# Patient Record
Sex: MALE | Race: WHITE
[De-identification: names, ages, dates, MRNs, and addresses within clinical notes are randomized per-mention and may not be internally consistent; named-entity substitution may affect disease eponyms.]

---

## 2019-01-26 ENCOUNTER — HOSPITAL ENCOUNTER (OUTPATIENT)
Dept: HOSPITAL 62 - ER | Age: 24
Setting detail: OBSERVATION
LOS: 1 days | Discharge: HOME | End: 2019-01-27
Attending: SURGERY | Admitting: SURGERY
Payer: OTHER GOVERNMENT

## 2019-01-26 DIAGNOSIS — F41.9: ICD-10-CM

## 2019-01-26 DIAGNOSIS — K35.33: Primary | ICD-10-CM

## 2019-01-26 DIAGNOSIS — F43.10: ICD-10-CM

## 2019-01-26 DIAGNOSIS — Z98.890: ICD-10-CM

## 2019-01-26 DIAGNOSIS — F17.290: ICD-10-CM

## 2019-01-26 LAB
ABSOLUTE LYMPHOCYTES# (MANUAL): 3.1 10^3/UL (ref 0.5–4.7)
ABSOLUTE MONOCYTES # (MANUAL): 1.4 10^3/UL (ref 0.1–1.4)
ABSOLUTE NEUTROPHILS# (MANUAL): 15.7 10^3/UL (ref 1.7–8.2)
ADD MANUAL DIFF: YES
ALBUMIN SERPL-MCNC: 5.2 G/DL (ref 3.5–5)
ALP SERPL-CCNC: 83 U/L (ref 38–126)
ALT SERPL-CCNC: 46 U/L (ref 21–72)
ANION GAP SERPL CALC-SCNC: 13 MMOL/L (ref 5–19)
AST SERPL-CCNC: 25 U/L (ref 17–59)
BASOPHILS NFR BLD MANUAL: 0 % (ref 0–2)
BILIRUB DIRECT SERPL-MCNC: 0.2 MG/DL (ref 0–0.4)
BILIRUB SERPL-MCNC: 0.4 MG/DL (ref 0.2–1.3)
BUN SERPL-MCNC: 19 MG/DL (ref 7–20)
CALCIUM: 10.4 MG/DL (ref 8.4–10.2)
CHLORIDE SERPL-SCNC: 99 MMOL/L (ref 98–107)
CO2 SERPL-SCNC: 29 MMOL/L (ref 22–30)
EOSINOPHIL NFR BLD MANUAL: 2 % (ref 0–6)
ERYTHROCYTE [DISTWIDTH] IN BLOOD BY AUTOMATED COUNT: 12.8 % (ref 11.5–14)
GLUCOSE SERPL-MCNC: 128 MG/DL (ref 75–110)
HCT VFR BLD CALC: 46.1 % (ref 37.9–51)
HGB BLD-MCNC: 16 G/DL (ref 13.5–17)
LIPASE SERPL-CCNC: 47.8 U/L (ref 23–300)
MCH RBC QN AUTO: 30.3 PG (ref 27–33.4)
MCHC RBC AUTO-ENTMCNC: 34.6 G/DL (ref 32–36)
MCV RBC AUTO: 88 FL (ref 80–97)
MONOCYTES % (MANUAL): 7 % (ref 3–13)
PLATELET # BLD: 346 10^3/UL (ref 150–450)
PLATELET COMMENT: ADEQUATE
PLATELET LARGE: PRESENT
POTASSIUM SERPL-SCNC: 4.5 MMOL/L (ref 3.6–5)
PROT SERPL-MCNC: 8.1 G/DL (ref 6.3–8.2)
RBC # BLD AUTO: 5.27 10^6/UL (ref 4.35–5.55)
SCHISTOCYTES BLD QL SMEAR: SLIGHT
SEGMENTED NEUTROPHILS % (MAN): 76 % (ref 42–78)
SODIUM SERPL-SCNC: 140.8 MMOL/L (ref 137–145)
TOTAL CELLS COUNTED BLD: 100
VARIANT LYMPHS NFR BLD MANUAL: 15 % (ref 13–45)
WBC # BLD AUTO: 20.6 10^3/UL (ref 4–10.5)

## 2019-01-26 PROCEDURE — 96374 THER/PROPH/DIAG INJ IV PUSH: CPT

## 2019-01-26 PROCEDURE — 36415 COLL VENOUS BLD VENIPUNCTURE: CPT

## 2019-01-26 PROCEDURE — 76700 US EXAM ABDOM COMPLETE: CPT

## 2019-01-26 PROCEDURE — 87040 BLOOD CULTURE FOR BACTERIA: CPT

## 2019-01-26 PROCEDURE — 93010 ELECTROCARDIOGRAM REPORT: CPT

## 2019-01-26 PROCEDURE — 80307 DRUG TEST PRSMV CHEM ANLYZR: CPT

## 2019-01-26 PROCEDURE — 96361 HYDRATE IV INFUSION ADD-ON: CPT

## 2019-01-26 PROCEDURE — 83690 ASSAY OF LIPASE: CPT

## 2019-01-26 PROCEDURE — 93005 ELECTROCARDIOGRAM TRACING: CPT

## 2019-01-26 PROCEDURE — 81001 URINALYSIS AUTO W/SCOPE: CPT

## 2019-01-26 PROCEDURE — 74177 CT ABD & PELVIS W/CONTRAST: CPT

## 2019-01-26 PROCEDURE — 99284 EMERGENCY DEPT VISIT MOD MDM: CPT

## 2019-01-26 PROCEDURE — 88304 TISSUE EXAM BY PATHOLOGIST: CPT

## 2019-01-26 PROCEDURE — 85025 COMPLETE CBC W/AUTO DIFF WBC: CPT

## 2019-01-26 PROCEDURE — 80053 COMPREHEN METABOLIC PANEL: CPT

## 2019-01-26 PROCEDURE — 44970 LAPAROSCOPY APPENDECTOMY: CPT

## 2019-01-26 PROCEDURE — 96375 TX/PRO/DX INJ NEW DRUG ADDON: CPT

## 2019-01-26 PROCEDURE — 83605 ASSAY OF LACTIC ACID: CPT

## 2019-01-26 NOTE — RADIOLOGY REPORT (SQ)
EXAM DESCRIPTION: 



CT ABDOMEN PELVIS WITH IV CONTRAST



COMPLETED DATE/TME:  01/26/2019 23:12



CLINICAL HISTORY: 



23 years, Male, abdominal pain



COMPARISON:

None.



TECHNIQUE:

431  Images stored on PACS.

 

All CT scanners at this facility use dose modulation, iterative

reconstruction, and/or weight based dosing when appropriate to

reduce radiation dose to as low as reasonably achievable (ALARA).





[facility]: Dose Right [facility]: CareDose   [facility]: Dose Right    [facility]:

Teradose 4D    [facility]: Smart Technologies



LIMITATIONS:

None.



FINDINGS:



Limited evaluation of the lung bases is unremarkable. Osseous

structures are grossly intact. The liver, spleen, adrenal glands,

pancreas, kidneys are unremarkable. The gallbladder is present.

No gross evidence for bowel obstruction. Fat-containing

periumbilical hernia. Appendicoliths are noted, however the

appendix is otherwise unremarkable. No free air or free fluid.





IMPRESSION:



Negative for acute intra-abdominal/pelvic process.

 

TECHNICAL DOCUMENTATION:



Quality ID # 436: Final reports with documentation of one or more

dose reduction techniques (e.g., Automated exposure control,

adjustment of the mA and/or kV according to patient size, use of

iterative reconstruction technique)



copyright 2011 [employer]- All Rights Reserved

## 2019-01-27 VITALS — SYSTOLIC BLOOD PRESSURE: 136 MMHG | DIASTOLIC BLOOD PRESSURE: 73 MMHG

## 2019-01-27 LAB
ABSOLUTE LYMPHOCYTES# (MANUAL): 0.7 10^3/UL (ref 0.5–4.7)
ABSOLUTE MONOCYTES # (MANUAL): 0.7 10^3/UL (ref 0.1–1.4)
ABSOLUTE NEUTROPHILS# (MANUAL): 21.9 10^3/UL (ref 1.7–8.2)
ADD MANUAL DIFF: YES
ALBUMIN SERPL-MCNC: 4.8 G/DL (ref 3.5–5)
ALP SERPL-CCNC: 76 U/L (ref 38–126)
ALT SERPL-CCNC: 40 U/L (ref 21–72)
ANION GAP SERPL CALC-SCNC: 12 MMOL/L (ref 5–19)
APPEARANCE UR: CLEAR
APTT PPP: YELLOW S
AST SERPL-CCNC: 21 U/L (ref 17–59)
BARBITURATES UR QL SCN: NEGATIVE
BASOPHILS NFR BLD MANUAL: 0 % (ref 0–2)
BILIRUB DIRECT SERPL-MCNC: 0.3 MG/DL (ref 0–0.4)
BILIRUB SERPL-MCNC: 0.8 MG/DL (ref 0.2–1.3)
BILIRUB UR QL STRIP: NEGATIVE
BUN SERPL-MCNC: 16 MG/DL (ref 7–20)
CALCIUM: 9.7 MG/DL (ref 8.4–10.2)
CHLORIDE SERPL-SCNC: 100 MMOL/L (ref 98–107)
CO2 SERPL-SCNC: 28 MMOL/L (ref 22–30)
EOSINOPHIL NFR BLD MANUAL: 0 % (ref 0–6)
ERYTHROCYTE [DISTWIDTH] IN BLOOD BY AUTOMATED COUNT: 12.9 % (ref 11.5–14)
GLUCOSE SERPL-MCNC: 132 MG/DL (ref 75–110)
GLUCOSE UR STRIP-MCNC: NEGATIVE MG/DL
HCT VFR BLD CALC: 44.5 % (ref 37.9–51)
HGB BLD-MCNC: 15.3 G/DL (ref 13.5–17)
KETONES UR STRIP-MCNC: (no result) MG/DL
LIPASE SERPL-CCNC: 37.1 U/L (ref 23–300)
MCH RBC QN AUTO: 30.4 PG (ref 27–33.4)
MCHC RBC AUTO-ENTMCNC: 34.3 G/DL (ref 32–36)
MCV RBC AUTO: 89 FL (ref 80–97)
METHADONE UR QL SCN: NEGATIVE
MONOCYTES % (MANUAL): 3 % (ref 3–13)
NITRITE UR QL STRIP: NEGATIVE
PCP UR QL SCN: NEGATIVE
PH UR STRIP: 6 [PH] (ref 5–9)
PLATELET # BLD: 324 10^3/UL (ref 150–450)
PLATELET COMMENT: ADEQUATE
PLATELET LARGE: PRESENT
POTASSIUM SERPL-SCNC: 4.5 MMOL/L (ref 3.6–5)
PROT SERPL-MCNC: 7.5 G/DL (ref 6.3–8.2)
PROT UR STRIP-MCNC: NEGATIVE MG/DL
RBC # BLD AUTO: 5.01 10^6/UL (ref 4.35–5.55)
SCHISTOCYTES BLD QL SMEAR: SLIGHT
SEGMENTED NEUTROPHILS % (MAN): 94 % (ref 42–78)
SODIUM SERPL-SCNC: 139.9 MMOL/L (ref 137–145)
SP GR UR STRIP: 1.05
TOTAL CELLS COUNTED BLD: 100
TOXIC GRANULES BLD QL SMEAR: SLIGHT
URINE AMPHETAMINES SCREEN: NEGATIVE
URINE BENZODIAZEPINES SCREEN: NEGATIVE
URINE COCAINE SCREEN: (no result)
URINE MARIJUANA (THC) SCREEN: NEGATIVE
UROBILINOGEN UR-MCNC: NEGATIVE MG/DL (ref ?–2)
VARIANT LYMPHS NFR BLD MANUAL: 3 % (ref 13–45)
WBC # BLD AUTO: 23.3 10^3/UL (ref 4–10.5)

## 2019-01-27 PROCEDURE — 0DTJ4ZZ RESECTION OF APPENDIX, PERCUTANEOUS ENDOSCOPIC APPROACH: ICD-10-PCS | Performed by: SURGERY

## 2019-01-27 RX ADMIN — SODIUM CHLORIDE SCH MLS/HR: 9 INJECTION, SOLUTION INTRAVENOUS at 06:23

## 2019-01-27 RX ADMIN — SODIUM CHLORIDE SCH MLS/HR: 9 INJECTION, SOLUTION INTRAVENOUS at 12:10

## 2019-01-27 RX ADMIN — KETOROLAC TROMETHAMINE SCH MG: 30 INJECTION, SOLUTION INTRAMUSCULAR at 12:10

## 2019-01-27 RX ADMIN — PIPERACILLIN AND TAZOBACTAM SCH MLS/HR: 3; .375 INJECTION, POWDER, LYOPHILIZED, FOR SOLUTION INTRAVENOUS; PARENTERAL at 17:03

## 2019-01-27 RX ADMIN — PIPERACILLIN AND TAZOBACTAM SCH MLS/HR: 3; .375 INJECTION, POWDER, LYOPHILIZED, FOR SOLUTION INTRAVENOUS; PARENTERAL at 11:26

## 2019-01-27 RX ADMIN — KETOROLAC TROMETHAMINE SCH MG: 30 INJECTION, SOLUTION INTRAMUSCULAR at 06:11

## 2019-01-27 NOTE — OPERATIVE REPORT E
Operative Report



NAME: DANNIE BUSH

MRN:  [id number]          : 1995 AGE:  23Y

DATE OF SURGERY: 2019              ROOM: 212



PREOPERATIVE DIAGNOSIS:

ACUTE APPENDICITIS.



POSTOPERATIVE DIAGNOSIS:

ACUTE APPENDICITIS.



OPERATION:

Laparoscopic appendectomy.



SURGEON:

GAURAV REYNOLDS M.D.



ANESTHESIA:

General.



INDICATION:

This is a 33-ar-old male complaining of severe epigastric pains at 9 p.m.

last night with vomiting.  Patient went to ED and CAT scan of the abdomen

showed appendicles but no evidence of inflammation.  However, his white

count was up to 20,000.  Patient was observed in the ED and this morning

his pain was localized in the right lower quadrant where he was also very

tender with rebound.   He was then taken to the OR for laparoscopic

appendectomy.



PROCEDURE:

After adequate IV general anesthesia, the patient was placed in the supine

position.  The abdomen prepped and draped in the usual sterile fashion. 

Appropriate timeout was called.  Next, an infraumbilical incision through

the old incision site was made and carried through the fascia.  The fascia

was then grasped with 2 Kocher clamps on each side and divided in the

middle.  Two sutures of 0-Vicryl were placed on each side of the Kocher

clamps.  The Kocher clamps released. Digital dissection and palpation of

the fascia and peritoneal cavity was done.  No evidence of adhesions noted

to the peritoneal area.



Next, a Erlin trocar was then inserted to the abdomen through the fascia and 
CO2 insufflated to a pressure

of 15 mmHg.  Two other trocars were placed, a 5 mm in the suprapubic and a

12 mm in the left lower quadrant under direct vision. The appendix was then 
identified and

noted to be adherent to the lateral wall.  The tip of the appendix was

subsequently grasped and lifted up and adhesions lysed with use of

Harmonic wendy.  Mesoappendix was then cauterized and divided with the

use of Harmonic wendy.  The base of the appendix was dissected close to

the cecum with the use of the Harmonic wendy.  The appendix noted to be

just mildly inflamed and dilated.  The base of the appendix was

subsequently stapled with a 45 mm blue load.  The appendix was then placed

in an Endo bag and pulled out of the umbilical port.  Munoz trocar was

put back and the appendiceal stump was inspected and noted to be with no active

bleeding.  It was gently irrigated and again showed good hemostasis.  No  other 
obvious abnormality in the

abdominal cavity on inspection.  All of the trocars were then removed and

CO2 allowed to come out of the trocar sites.  The infraumbilical fascia

was then closed with a figure-of-eight suture using 0-Vicryl and the 2

stay sutures tied together over the fscia. The fascia was anesthetized with 0.5%

Marcaine with epinephrine and all of the skin incisions.  The skin

incisions were then closed with running subcuticular 4-0 Vicryl undyed. 

Steri-Strips were then placed over the incision sites.  Needle,

instrument, and sponge count were all correct.  Estimated blood loss was

about 5 mL.  The patient then brought to recovery room in satisfactory

condition, extubated.



 





DICTATING PHYSICIAN:  GAURAV REYNOLDS M.D.





[id number]                  DT: 2019    1109

PHY#: [id number]            DD: 2019    0949

ID:   [id number]           JOB#: [id number]       ACCT: [id number]



cc:GAURAV REYNOLDS M.D.

>







MTDD

## 2019-01-27 NOTE — PDOC H&P
History of Present Illness


Admission Date/PCP: 


  





  CURLY DANG MD





Patient complains of: abdominal pains


History of Present Illness: 


DANNIE BUSH is a 23 year old male with history of PTSD, suddenly c/o 

severe epigastric golden associated with prolonged vomiting( 30 minutes) at 9 pm 

1/26/19. He did complain also of chilly sensation.


Had umbilical hernia repair done at hospitals in November,2018.


His WBC is 20.6.











Past Medical History


Psychiatric Medical History: Reports: Other - PTSD and nightmares





Past Surgical History


Past Surgical History: Reports: Other - umbilical hernia repair. Moles removed 

from left arm and underneath chin





Social History


Smoking Status: Current Some Day Smoker - uses "Vape"


Frequency of Alcohol Use: Social





Family History


Family History: Reviewed & Not Pertinent


Parental Family History Reviewed: Yes - Cancer


Children Family History Reviewed: No


Sibling(s) Family History Reviewed.: No





Medication/Allergy


Home Medications: 








Citalopram Hydrobromide [Celexa 20 mg Tablet] 20 mg PO DAILY 01/26/19 


Prazosin HCl [Minipress] 2 mg PO QHS 01/26/19 








Allergies/Adverse Reactions: 


                                        





No Known Allergies Allergy (Unverified 01/26/19 22:54)


   











Review of Systems


Constitutional: PRESENT: chills - chilly sensation


Eyes: PRESENT: other - no visual/hearing changes


Cardiovascular: PRESENT: other - no chest pains/cough


Gastrointestinal: PRESENT: abdominal pain, vomiting


Psychiatric: PRESENT: anxiety


Hematologic/Lymphatic: PRESENT: other - no easy bruising





Physical Exam


Vital Signs: 


                                        











Temp Pulse Resp BP Pulse Ox


 


 97.6 F   71   18   141/90 H  100 


 


 01/26/19 22:51  01/26/19 22:51  01/26/19 22:51  01/26/19 22:51  01/26/19 22:51








                                 Intake & Output











 01/25/19 01/26/19 01/27/19





 06:59 06:59 06:59


 


Intake Total   1000


 


Balance   1000


 


Weight   92.5 kg











General appearance: PRESENT: severe distress


Head exam: PRESENT: atraumatic


Eye exam: PRESENT: conjunctiva pink


Mouth exam: PRESENT: moist


Neck exam: PRESENT: full ROM


Respiratory exam: PRESENT: clear to auscultation magdaleno


Cardiovascular exam: PRESENT: RRR


Pulses: PRESENT: normal radial pulses


Vascular exam: PRESENT: normal capillary refill


GI/Abdominal exam: PRESENT: tenderness - epigastric area rest of abdomen non 

tender


Rectal exam: PRESENT: deferred


Extremities exam: PRESENT: full ROM


Musculoskeletal exam: PRESENT: ambulatory


Neurological exam: PRESENT: alert, oriented to person, oriented to place, 

oriented to time, oriented to situation


Psychiatric exam: PRESENT: anxious


Skin exam: PRESENT: normal color, warm





Results


Laboratory Results: 


                                        





                                 01/26/19 23:00 





                                 01/26/19 23:00 





                                        











  01/26/19 01/26/19 01/26/19





  23:00 23:00 23:50


 


WBC  20.6 H  


 


RBC  5.27  


 


Hgb  16.0  


 


Hct  46.1  


 


MCV  88  


 


MCH  30.3  


 


MCHC  34.6  


 


RDW  12.8  


 


Plt Count  346  


 


Seg Neutrophils %  Not Reportable  


 


Lymphocytes %  Not Reportable  


 


Monocytes %  Not Reportable  


 


Eosinophils %  Not Reportable  


 


Basophils %  Not Reportable  


 


Absolute Neutrophils  Not Reportable  


 


Absolute Lymphocytes  Not Reportable  


 


Absolute Monocytes  Not Reportable  


 


Absolute Eosinophils  Not Reportable  


 


Absolute Basophils  Not Reportable  


 


Sodium   140.8 


 


Potassium   4.5 


 


Chloride   99 


 


Carbon Dioxide   29 


 


Anion Gap   13 


 


BUN   19 


 


Creatinine   0.91 


 


Est GFR ( Amer)   > 60 


 


Est GFR (Non-Af Amer)   > 60 


 


Glucose   128 H 


 


Lactic Acid    1.9


 


Calcium   10.4 H 


 


Total Bilirubin   0.4 


 


AST   25 


 


ALT   46 


 


Alkaline Phosphatase   83 


 


Total Protein   8.1 


 


Albumin   5.2 H 


 


Lipase   47.8 


 


Urine Color   


 


Urine Appearance   


 


Urine pH   


 


Ur Specific Gravity   


 


Urine Protein   


 


Urine Glucose (UA)   


 


Urine Ketones   


 


Urine Blood   


 


Urine Nitrite   


 


Ur Leukocyte Esterase   


 


Urine WBC (Auto)   


 


Urine RBC (Auto)   














  01/26/19





  23:55


 


WBC 


 


RBC 


 


Hgb 


 


Hct 


 


MCV 


 


MCH 


 


MCHC 


 


RDW 


 


Plt Count 


 


Seg Neutrophils % 


 


Lymphocytes % 


 


Monocytes % 


 


Eosinophils % 


 


Basophils % 


 


Absolute Neutrophils 


 


Absolute Lymphocytes 


 


Absolute Monocytes 


 


Absolute Eosinophils 


 


Absolute Basophils 


 


Sodium 


 


Potassium 


 


Chloride 


 


Carbon Dioxide 


 


Anion Gap 


 


BUN 


 


Creatinine 


 


Est GFR ( Amer) 


 


Est GFR (Non-Af Amer) 


 


Glucose 


 


Lactic Acid 


 


Calcium 


 


Total Bilirubin 


 


AST 


 


ALT 


 


Alkaline Phosphatase 


 


Total Protein 


 


Albumin 


 


Lipase 


 


Urine Color  YELLOW


 


Urine Appearance  CLEAR


 


Urine pH  6.0


 


Ur Specific Gravity  1.046


 


Urine Protein  NEGATIVE


 


Urine Glucose (UA)  NEGATIVE


 


Urine Ketones  TRACE H


 


Urine Blood  NEGATIVE


 


Urine Nitrite  NEGATIVE


 


Ur Leukocyte Esterase  NEGATIVE


 


Urine WBC (Auto)  0


 


Urine RBC (Auto)  0











Impressions: 


                                        





Abdomen/Pelvis CT  01/26/19 23:12


IMPRESSION:


 


Negative for acute intra-abdominal/pelvic process.


 


TECHNICAL DOCUMENTATION:


 


Quality ID # 436: Final reports with documentation of one or more


dose reduction techniques (e.g., Automated exposure control,


adjustment of the mA and/or kV according to patient size, use of


iterative reconstruction technique)


 


copyright 2011 [employer]- All Rights Reserved


 














Assessment & Plan





- Diagnosis


(1) Abdominal pain


Qualifiers: 


   Abdominal location: unspecified location   Qualified Code(s): R10.9 - Un

specified abdominal pain   


Is this a current diagnosis for this admission?: Yes   





(2) Leukocytosis


Qualifiers: 


   Leukocytosis type: unspecified   Qualified Code(s): D72.829 - Elevated white 

blood cell count, unspecified   


Is this a current diagnosis for this admission?: Yes   





(3) Vomiting


Qualifiers: 


   Vomiting type: unspecified   Vomiting Intractability: unspecified   Nausea 

presence: with nausea   Qualified Code(s): R11.2 - Nausea with vomiting, 

unspecified   





- Time


Time Spent: 30 to 50 Minutes





- Inpatient Certification


Medical Necessity: Need Close Monitoring Due to Risk of Patient Decompensation, 

Need For IV Fluids, Need for Pain Control, Risk of Complication if Not Cared For

 in Hospital





- Plan Summary


Plan Summary: 





Hold off antibiotics. Obtain blood c/s


Re-evaluate in am with repeat CBC with diff


Hydrate


Add toradol for pain

## 2019-01-28 NOTE — DISCHARGE SUMMARY E
Discharge Summary



NAME: DANNIE BUSH

MRN:  [id number]        : 1995     AGE: 23Y

ADMITTED: 2019                  DISCHARGED: 2019



PROCEDURE DONE:

Laparoscopic appendectomy.



FINAL DIAGNOSIS:

Acute appendicitis.



HOSPITAL COURSE:

This is a 23-year-old male who complained of epigastric pains around 9

p.m. of 2019.  He then went to the ED where a CAT scan of the

abdomen revealed appendicolith, but no inflammation and no obvious acute

changes.  His white count is elevated to 20,000.  He was then admitted on

2019 for observation.  On the morning of 2019, his pains

localized in the right lower quadrant with tenderness.  He was then taken

to the OR on the morning of 2019 for a laparoscopic appendectomy. 

Laparoscopic appendectomy done by Dr. Nicolas for acute appendicitis. 

Postoperatively, the patient did very well, able to tolerate soft diet

later the day of surgery.  Patient also with minimal pains.  He was then

discharged improved on 2019 with the above final diagnosis.  Patient

given a note to go back to work in 2 weeks and a prescription for Toradol

10 mg p.o. q. 6 hours p.r.n. for pain.  The patient to be followed up in

the surgical clinic in about 10 days.





DICTATING PHYSICIAN:  GAURAV NICOLAS M.D.





[id number]                  DT: 2019    1222

PHY#: [id number]            DD: 2019    2220

ID:   [id number]           JOB#: [id number]       ACCT: [id number]



cc:GAURAV NICOLAS M.D.

>

## 2019-06-15 NOTE — EKG REPORT
SEVERITY:- NORMAL ECG -

SINUS RHYTHM

:

Confirmed by: Jonna Gonzalez MD 28-Jan-2019 07:29:05 English
